# Patient Record
Sex: MALE | Race: OTHER | HISPANIC OR LATINO | URBAN - METROPOLITAN AREA
[De-identification: names, ages, dates, MRNs, and addresses within clinical notes are randomized per-mention and may not be internally consistent; named-entity substitution may affect disease eponyms.]

---

## 2023-02-07 ENCOUNTER — EMERGENCY (EMERGENCY)
Facility: HOSPITAL | Age: 33
LOS: 1 days | Discharge: HOME | End: 2023-02-07
Attending: EMERGENCY MEDICINE
Payer: COMMERCIAL

## 2023-02-07 VITALS
OXYGEN SATURATION: 96 % | WEIGHT: 169.98 LBS | DIASTOLIC BLOOD PRESSURE: 95 MMHG | SYSTOLIC BLOOD PRESSURE: 143 MMHG | RESPIRATION RATE: 18 BRPM | HEART RATE: 92 BPM | HEIGHT: 66 IN | TEMPERATURE: 98 F

## 2023-02-07 PROCEDURE — 99283 EMERGENCY DEPT VISIT LOW MDM: CPT | Mod: 25

## 2023-02-07 PROCEDURE — 96372 THER/PROPH/DIAG INJ SC/IM: CPT

## 2023-02-07 PROCEDURE — 99284 EMERGENCY DEPT VISIT MOD MDM: CPT

## 2023-02-07 RX ORDER — KETOROLAC TROMETHAMINE 30 MG/ML
15 SYRINGE (ML) INJECTION ONCE
Refills: 0 | Status: DISCONTINUED | OUTPATIENT
Start: 2023-02-07 | End: 2023-02-07

## 2023-02-07 RX ORDER — METHOCARBAMOL 500 MG/1
750 TABLET, FILM COATED ORAL ONCE
Refills: 0 | Status: DISCONTINUED | OUTPATIENT
Start: 2023-02-07 | End: 2023-02-07

## 2023-02-07 RX ORDER — ACETAMINOPHEN 500 MG
975 TABLET ORAL ONCE
Refills: 0 | Status: COMPLETED | OUTPATIENT
Start: 2023-02-07 | End: 2023-02-07

## 2023-02-07 RX ORDER — METHOCARBAMOL 500 MG/1
2 TABLET, FILM COATED ORAL
Qty: 12 | Refills: 0
Start: 2023-02-07 | End: 2023-02-08

## 2023-02-07 RX ADMIN — Medication 15 MILLIGRAM(S): at 19:08

## 2023-02-07 RX ADMIN — Medication 975 MILLIGRAM(S): at 19:06

## 2023-02-07 NOTE — ED ADULT NURSE NOTE - OBJECTIVE STATEMENT
31 yo male no PMH, A&OX3, presents to ED c/o lower back pain.  Pt reports having lower back pain for years but recently pain has been worsening w/ cramping going down both legs.  denies falls/injuries. Breathing even and unlabored, abdomen soft nontender, no pedal edema. Pt denies chest pain, palpitations, shortness of breath, headache, visual disturbances, numbness/tingling, fever, chills, diaphoresis,  nausea, vomiting, constipation, diarrhea, or urinary symptoms.

## 2023-02-07 NOTE — ED PROVIDER NOTE - PROGRESS NOTE DETAILS
Pt reports improvement in pain. Ambulating w/o difficulty. Will send rx to pharmacy  Pain control dw pt. F/u placed in chart  Verbalized understanding. Elkin Kumar PA-C

## 2023-02-07 NOTE — ED PROVIDER NOTE - PHYSICAL EXAMINATION
CONSTITUTIONAL: Well appearing and in no apparent distress.  ENT: Airway patent, moist mucous membranes.   EYES: Pupils equal, round and reactive to light. EOMI. Conjunctiva normal appearing.   CARDIAC: Normal rate, regular rhythm.  Heart sounds S1, S2.    RESPIRATORY: Breath sounds clear and equal bilaterally.   GASTROINTESTINAL: Abdomen soft, non-tender, not distended.  MUSCULOSKELETAL: Spine appears normal. Paraspinal TTP to lumbar spine. No midline TTP. Moving all extremities. Antalgic gait.   NEUROLOGICAL: Alert and oriented x3, no focal deficits, no motor or sensory deficits. 5/5 muscle strength throughout. No saddle anesthesia.   SKIN: Skin normal color, warm, dry and intact.   PSYCHIATRIC: Normal mood and affect.

## 2023-02-07 NOTE — ED PROVIDER NOTE - OBJECTIVE STATEMENT
33 yo M with no reported PMH p/w acute on chronic back pain x 4 yrs, worsening the last month. No recent trauma or injuries. Pain radiates down bilateral LEs. Associated with intermittent leg cramping. Subjective fevers, no chills. Temporarily relived with Advil, last took this AM. Has been to [facility] "a while ago", was medicated and discharged. Has never seen specialist for sxs. Works as a  at a restaurant. Normal BMs. Denies nausea, vomiting, fever, chills, chest pain, SOB, cough, abd pain, headache, neck pain, bladder/bowel dsfxn, extremity weakness, paresthesias.

## 2023-02-07 NOTE — ED PROVIDER NOTE - NSFOLLOWUPINSTRUCTIONS_ED_ALL_ED_FT
You were seen and evaluated in the ED for back pain.   Please make sure to follow up with your primary care doctor within 1-2 days and with the SPINE specialist.   Return to the ER as discussed if you develop any new or worsening symptoms.     You may take Tylenol 1000mg and Ibuprofen 400mg every 6 hours as needed for pain. Take Ibuprofen with food. You were seen and evaluated in the ED for back pain.   Please make sure to follow up with your primary care doctor within 1-2 days and with the SPINE specialist.   Return to the ER as discussed if you develop any new or worsening symptoms.     You may take Tylenol 1000mg AND/OR Ibuprofen 400mg every 6 hours as needed for pain. Take Ibuprofen with food. Take the prescribed pain medication as directed.     [id number].[id number] for your back pain, call and arrange your follow up appointment.   A referral was also placed to see the specialist.    _________________________________________________________________________________________    Lo vieron y lo evaluaron en el servicio de urgencias por dolor de espalda.  Asegúrese de hacer un seguimiento con oglesby médico de atención primaria dentro de 1 a 2 días y con el especialista en [employer] [facility].  Regrese a la juan jose de emergencias daysi se indicó si presenta síntomas nuevos o que empeoran.    Puede duke Tylenol 1000 mg Y/O Ibuprofeno 400 mg cada 6 horas según sea necesario para el dolor. [place] ibuprofeno con alimentos. [place] el analgésico recetado según las indicaciones.    [id number] para oglesby dolor de espalda, llame y programe oglesby bailey de seguimiento.  También se colocó darien referencia para hollis al especialista.

## 2023-02-07 NOTE — ED ADULT TRIAGE NOTE - CHIEF COMPLAINT QUOTE
lower back pain with radiation with bilateral legs, denies PMH, denies urinary incontinence, denies recent falls

## 2023-02-07 NOTE — ED PROVIDER NOTE - NS ED ATTENDING STATEMENT MOD
This was a shared visit with the IZZY. I reviewed and verified the documentation and independently performed the documented:

## 2023-02-07 NOTE — ED PROVIDER NOTE - ATTENDING APP SHARED VISIT CONTRIBUTION OF CARE
Attending MD Coello:   I personally have seen and examined this patient. I have performed a substantive portion of the visit including all aspects of the medical decision making.   Physician assistant note reviewed and agree on plan of care and except where noted.        32-year-old gentleman presenting for evaluation of acute on chronic back pain.  States his symptoms have been present for 4 years but particularly worse over the last month.  States that the pain is throughout the entire back but more in the low back.  He denies any specific injuries.  This pain radiates down both legs.  Denies any bowel or bladder dysfunction.  Patient does state he says when the pain gets worse he feels warm in his body but he has not measured any tcao fevers.  Patient has used Advil which helps with the pain but only temporarily.  He has not had any other specific therapies for the pain.  Denies any IV drug use.  No saddle anesthesia.  He took Advil today.    Patient's vital signs are nonactionable.  Sitting up in the stretcher.  No distress noted.  Patient is able to stand easily without much difficulty.  No midline spinal tenderness to palpation. 5/5 strength in bilateral lower extremities, sensation grossly intact to light touch throughout bilateral lower extremities 2+ patellar and Achilles reflexes bilaterally.  5/5 strength in bilateral upper extremities.  Normal affect.  Steady gait.    Patient is presenting for acute on chronic generalized back pain.  Patient does not have any overt red flag signs or symptoms to suggest impending cauda equina or serious spinal pathology, especially given chronic nature of pain and reassuring examination.  Normal neurologic examination.  We will continue treatment with NSAIDs and refer patient to spine center for consideration of physical therapy.  Does not warrant emergent spinal imaging but may benefit from nonurgent spinal imaging if patient does not improve over the next few weeks.      *The above represents an initial assessment/impression. Please refer to progress notes for potential changes in patient clinical course*

## 2023-02-07 NOTE — ED PROVIDER NOTE - PATIENT PORTAL LINK FT
You can access the FollowMyHealth Patient Portal offered by [facility] by registering at the following website: [email or web address]. By joining [facility]’s FollowMyHealth portal, you will also be able to view your health information using other applications (apps) compatible with our system.

## 2023-02-11 ENCOUNTER — APPOINTMENT (OUTPATIENT)
Dept: ORTHOPEDIC SURGERY | Facility: CLINIC | Age: 33
End: 2023-02-11

## 2023-02-11 PROBLEM — Z00.00 ENCOUNTER FOR PREVENTIVE HEALTH EXAMINATION: Status: ACTIVE | Noted: 2023-02-11
